# Patient Record
Sex: FEMALE | Race: BLACK OR AFRICAN AMERICAN | NOT HISPANIC OR LATINO | URBAN - NONMETROPOLITAN AREA
[De-identification: names, ages, dates, MRNs, and addresses within clinical notes are randomized per-mention and may not be internally consistent; named-entity substitution may affect disease eponyms.]

---

## 2019-07-16 ENCOUNTER — IMPORTED ENCOUNTER (OUTPATIENT)
Dept: URBAN - NONMETROPOLITAN AREA CLINIC 1 | Facility: CLINIC | Age: 49
End: 2019-07-16

## 2019-07-16 PROBLEM — H25.813: Noted: 2019-07-16

## 2019-07-16 PROBLEM — H52.13: Noted: 2019-07-16

## 2019-07-16 PROBLEM — H40.013: Noted: 2019-07-16

## 2019-07-16 PROBLEM — H52.4: Noted: 2019-07-16

## 2019-07-16 PROCEDURE — 92133 CPTRZD OPH DX IMG PST SGM ON: CPT

## 2019-07-16 PROCEDURE — 92015 DETERMINE REFRACTIVE STATE: CPT

## 2019-07-16 PROCEDURE — 92004 COMPRE OPH EXAM NEW PT 1/>: CPT

## 2019-07-16 NOTE — PATIENT DISCUSSION
Myopia-Discussed diagnosis with patient. -Explained that people who are myopic are at a higher risk for developing RD/RT and reviewed associated S&S.-Pt to contact our office if symptoms develop. Presbyopia-Discussed diagnosis with patient. Updated spec Rx given. Recommend lens that will provide comfort as well as protect safety and health of eyes. Cataract OU-Not yet surgical. -Reviewed symptoms of advancing cataract growth such as glare and halos and decreased vision.-Continue to monitor for now. Pt will notify us if any new symptoms develop. Glaucoma Suspect-Based on <C:D 0.3 and 0.5 IOP 21:19-Appears stable at this time.-Order OCT ONH today performed and reviewed w/pt-Continue to monitor with exams and testing. RTC 6 Mo IOP check OCT ONH

## 2020-01-14 ENCOUNTER — IMPORTED ENCOUNTER (OUTPATIENT)
Dept: URBAN - NONMETROPOLITAN AREA CLINIC 1 | Facility: CLINIC | Age: 50
End: 2020-01-14

## 2020-01-14 PROCEDURE — 99212 OFFICE O/P EST SF 10 MIN: CPT

## 2020-01-14 PROCEDURE — 92133 CPTRZD OPH DX IMG PST SGM ON: CPT

## 2020-01-14 NOTE — PATIENT DISCUSSION
Cataract OU-Reviewed symptoms of advancing cataract growth such as glare and halos and decreased vision.-Continue to monitor for now. Pt will notify us if any new symptoms develop. Glaucoma Suspect-Based on <C:D 0.3 and 0.5 IOP 21:20 01/14/20-Appears stable at this time. -OCT ONH performed and reviewed w/pt-Order VF -Continue to monitor with exams and testing. RTC 6 Mo CEE VF

## 2020-01-28 PROBLEM — H40.013: Noted: 2020-01-28

## 2020-01-28 PROBLEM — H25.813: Noted: 2020-01-28

## 2022-04-10 ASSESSMENT — TONOMETRY
OD_IOP_MMHG: 21
OS_IOP_MMHG: 20
OS_IOP_MMHG: 19
OD_IOP_MMHG: 21

## 2022-04-10 ASSESSMENT — VISUAL ACUITY
OD_SC: 20/20
OD_SC: 20/20
OS_SC: 20/20
OS_SC: 20/20